# Patient Record
Sex: MALE | Race: WHITE | URBAN - METROPOLITAN AREA
[De-identification: names, ages, dates, MRNs, and addresses within clinical notes are randomized per-mention and may not be internally consistent; named-entity substitution may affect disease eponyms.]

---

## 2017-05-12 ENCOUNTER — HOSPITAL ENCOUNTER (OUTPATIENT)
Dept: LAB | Age: 27
Discharge: HOME | End: 2017-05-12
Payer: COMMERCIAL

## 2017-05-12 ENCOUNTER — OFFICE VISIT (OUTPATIENT)
Dept: FAMILY MEDICINE CLINIC | Age: 27
End: 2017-05-12

## 2017-05-12 VITALS
BODY MASS INDEX: 29.26 KG/M2 | HEART RATE: 64 BPM | HEIGHT: 71 IN | OXYGEN SATURATION: 99 % | WEIGHT: 209 LBS | SYSTOLIC BLOOD PRESSURE: 141 MMHG | RESPIRATION RATE: 16 BRPM | TEMPERATURE: 97.8 F | DIASTOLIC BLOOD PRESSURE: 83 MMHG

## 2017-05-12 DIAGNOSIS — Z00.00 ROUTINE GENERAL MEDICAL EXAMINATION AT A HEALTH CARE FACILITY: ICD-10-CM

## 2017-05-12 DIAGNOSIS — Z23 ENCOUNTER FOR IMMUNIZATION: ICD-10-CM

## 2017-05-12 DIAGNOSIS — Z11.1 SCREENING EXAMINATION FOR PULMONARY TUBERCULOSIS: ICD-10-CM

## 2017-05-12 DIAGNOSIS — Z00.00 ROUTINE GENERAL MEDICAL EXAMINATION AT A HEALTH CARE FACILITY: Primary | ICD-10-CM

## 2017-05-12 LAB
25(OH)D3 SERPL-MCNC: 11.2 NG/ML (ref 30–100)
ALBUMIN SERPL BCP-MCNC: 4.3 G/DL (ref 3.4–5)
ALBUMIN/GLOB SERPL: 1.3 {RATIO} (ref 0.8–1.7)
ALP SERPL-CCNC: 44 U/L (ref 45–117)
ALT SERPL-CCNC: 35 U/L (ref 16–61)
ANION GAP BLD CALC-SCNC: 7 MMOL/L (ref 3–18)
AST SERPL W P-5'-P-CCNC: 25 U/L (ref 15–37)
BILIRUB SERPL-MCNC: 0.8 MG/DL (ref 0.2–1)
BILIRUB UR QL STRIP: NEGATIVE
BUN SERPL-MCNC: 22 MG/DL (ref 7–18)
BUN/CREAT SERPL: 24 (ref 12–20)
CALCIUM SERPL-MCNC: 8.7 MG/DL (ref 8.5–10.1)
CHLORIDE SERPL-SCNC: 106 MMOL/L (ref 100–108)
CHOLEST SERPL-MCNC: 179 MG/DL
CO2 SERPL-SCNC: 30 MMOL/L (ref 21–32)
CREAT SERPL-MCNC: 0.91 MG/DL (ref 0.6–1.3)
GLOBULIN SER CALC-MCNC: 3.2 G/DL (ref 2–4)
GLUCOSE SERPL-MCNC: 93 MG/DL (ref 74–99)
GLUCOSE UR-MCNC: NEGATIVE MG/DL
HBA1C MFR BLD: 5.2 % (ref 4.2–5.6)
HDLC SERPL-MCNC: 48 MG/DL (ref 40–60)
HDLC SERPL: 3.7 {RATIO} (ref 0–5)
KETONES P FAST UR STRIP-MCNC: NEGATIVE MG/DL
LDLC SERPL CALC-MCNC: 101.2 MG/DL (ref 0–100)
LIPID PROFILE,FLP: ABNORMAL
PH UR STRIP: 6 [PH] (ref 4.6–8)
POTASSIUM SERPL-SCNC: 4.4 MMOL/L (ref 3.5–5.5)
PROT SERPL-MCNC: 7.5 G/DL (ref 6.4–8.2)
PROT UR QL STRIP: NEGATIVE MG/DL
SODIUM SERPL-SCNC: 143 MMOL/L (ref 136–145)
SP GR UR STRIP: 1.02 (ref 1–1.03)
TRIGL SERPL-MCNC: 149 MG/DL (ref ?–150)
TSH W FREE THYROID I,TSHELE: 1.25 UIU/ML (ref 0.36–3.74)
UA UROBILINOGEN AMB POC: NORMAL (ref 0.2–1)
URINALYSIS CLARITY POC: CLEAR
URINALYSIS COLOR POC: YELLOW
URINE BLOOD POC: NEGATIVE
URINE LEUKOCYTES POC: NEGATIVE
URINE NITRITES POC: NEGATIVE
VLDLC SERPL CALC-MCNC: 29.8 MG/DL

## 2017-05-12 PROCEDURE — 80053 COMPREHEN METABOLIC PANEL: CPT | Performed by: PHYSICIAN ASSISTANT

## 2017-05-12 PROCEDURE — 84443 ASSAY THYROID STIM HORMONE: CPT | Performed by: PHYSICIAN ASSISTANT

## 2017-05-12 PROCEDURE — 80061 LIPID PANEL: CPT | Performed by: PHYSICIAN ASSISTANT

## 2017-05-12 PROCEDURE — 83036 HEMOGLOBIN GLYCOSYLATED A1C: CPT | Performed by: PHYSICIAN ASSISTANT

## 2017-05-12 PROCEDURE — 82306 VITAMIN D 25 HYDROXY: CPT | Performed by: PHYSICIAN ASSISTANT

## 2017-05-12 NOTE — MR AVS SNAPSHOT
Visit Information Date & Time Provider Department Dept. Phone Encounter #  
 5/12/2017 10:00 AM Ronna Moya PA-C [facility] [phone] [id number] Follow-up Instructions Return in about 1 year (around 5/12/2018). Upcoming Health Maintenance Date Due DTaP/Tdap/Td series (1 - Tdap) 12/10/2010 INFLUENZA AGE 9 TO ADULT 8/1/2017 Allergies as of 5/12/2017  Review Complete On: 5/12/2017 By: Margaret Cerda No Known Allergies Current Immunizations  Never Reviewed Name Date  
 TB Skin Test (PPD) Intradermal 5/12/2017 Not reviewed this visit You Were Diagnosed With   
  
 Codes Comments Routine general medical examination at a health care facility    -  Primary ICD-10-CM: Z00.00 ICD-9-CM: V70.0 Encounter for immunization     ICD-10-CM: F50 ICD-9-CM: V03.89 Screening examination for pulmonary tuberculosis     ICD-10-CM: Z11.1 ICD-9-CM: V74.1 Vitals BP Pulse Temp Resp Height(growth percentile) Weight(growth percentile) 141/83 (BP 1 Location: Right arm, BP Patient Position: Sitting) 64 97.8 °F (36.6 °C) (Oral) 16 5' 11\" (1.803 m) 209 lb (94.8 kg) SpO2 BMI Smoking Status 99% 29.15 kg/m2 Never Smoker Vitals History BMI and BSA Data Body Mass Index Body Surface Area  
 29.15 kg/m 2 2.18 m 2 Preferred Pharmacy Pharmacy Name Phone [facility], [address] [place] [address] [phone] Your Updated Medication List  
  
   
This list is accurate as of: 5/12/17 11:00 AM.  Always use your most recent med list.  
  
  
  
  
 clindamycin 300 mg capsule Commonly known as:  CLEOCIN Take 300 mg by mouth three (3) times daily. mupirocin 2 % ointment Commonly known as:  [facility] Apply twice daily to each nasal septum for 7 days We Performed the Following AMB POC HEMOGLOBIN A1C [88893 CPT(R)] AMB POC TUBERCULOSIS, INTRADERMAL (SKIN TEST) [71927 CPT(R)] AMB POC URINALYSIS DIP STICK AUTO W/O MICRO [91295 CPT(R)] Follow-up Instructions Return in about 1 year (around 5/12/2018). To-Do List   
 05/12/2017 Lab:  LIPID PANEL   
  
 05/12/2017 Lab:  METABOLIC PANEL, COMPREHENSIVE   
  
 05/12/2017 Lab:  THYROID CASCADE PROFILE   
  
 05/12/2017 Lab:  VITAMIN D, 25 HYDROXY Patient Instructions DASH Diet: Care Instructions Your Care Instructions The DASH diet is an eating plan that can help lower your blood pressure. DASH stands for Dietary Approaches to Stop Hypertension. Hypertension is high blood pressure. The DASH diet focuses on eating foods that are high in calcium, potassium, and magnesium. These nutrients can lower blood pressure. The foods that are highest in these nutrients are fruits, vegetables, dairy products, nuts, seeds, and legumes. But taking calcium, potassium, and magnesium supplements instead of eating foods that are high in those nutrients does not have the same effect. The DASH diet also includes whole grains, fish, and poultry. The DASH diet is one of several lifestyle changes your doctor may recommend to lower your high blood pressure. Your doctor may also want you to decrease the amount of sodium in your diet. Lowering sodium while following the DASH diet can lower blood pressure even further than just the DASH diet alone. Follow-up care is a key part of your treatment and safety. Be sure to make and go to all appointments, and call your doctor if you are having problems. It's also a good idea to know your test results and keep a list of the medicines you take. How can you care for yourself at home? Following the DASH diet Eat 4 to 5 servings of fruit each day.  A serving is 1 medium-sized piece of fruit, ½ cup chopped or canned fruit, 1/4 cup dried fruit, or 4 ounces (½ cup) of fruit juice. Choose whole fruit much more often than fruit juice. Eat 4 to 5 servings--or more--of vegetables each day. A serving is 1 cup of lettuce or raw leafy vegetables, ½ cup of chopped or cooked vegetables, or 4 ounces (½ cup) of vegetable juice. Choose vegetables more often than vegetable juice. Get 2 to 3 servings of dairy each day, if you tolerate dairy. A serving is 8 ounces of milk, 1 cup of yogurt, or 1 ½ ounces of cheese. Choose whole grains over refined grains if you eat grain-based foods (bread, rice, cereal, pasta). Beware of products labeled \"made with whole grains,\" as this is often a marker of over-processing. Choose products with the highest amount of fiber available. Eat 4 to 5 servings of nuts, seeds, and legumes (cooked dried beans, lentils, and split peas) each week. A serving is 1/3 cup of nuts, 2 tablespoons of seeds, or ½ cup of cooked beans or peas. Avoid trans fats, hydrogenated oils, and industrial oils such as \"vegetable,\" soy, and canola. Extra virgin olive and coconut oils are great alternatives, as well as nut and seed oils such as flax, walnut, and avocado. Limit sweets and added sugars to 5 servings or less a week. A serving is 1 tablespoon jelly or jam, ½ cup sorbet, or 1 cup of lemonade. Maintain a sensible intake of sodium, keeping in mind that highly processed foods and restaurant foods tend to be high in sodium and low in potassium, magnesium, and calcium. It is important to maintain a balance of these nutrients. http://authoritynutrition.com/how-much-sodium-per-day/ 
 
 
 
Tips for success Start small. Do not try to make dramatic changes to your diet all at once. You might feel that you are missing out on your favorite foods and then be more likely to not follow the plan. Make small changes, and stick with them. Once those changes become habit, add a few more changes. Try some of the following: Make it a goal to eat a fruit or vegetable at every meal and at snacks. This will make it easy to get the recommended amount of fruits and vegetables each day. Try yogurt topped with fruit and nuts for a snack or healthy dessert. Add lettuce, tomato, cucumber, and onion to sandwiches. Combine a ready-made pizza crust with low-fat mozzarella cheese and lots of vegetable toppings. Try using tomatoes, squash, spinach, broccoli, carrots, cauliflower, and onions. Have a variety of cut-up vegetables with a low-fat dip as an appetizer instead of chips and dip. Sprinkle sunflower seeds or chopped almonds over salads. Or try adding chopped walnuts or almonds to cooked vegetables. Try some vegetarian meals using beans and peas. Add garbanzo or kidney beans to salads. Make burritos and tacos with mashed kim beans or black beans. http://"[employer].eGym/how-much-sodium-per-day/ 
 
QuietOptions.co.uk Reducing dietary intake of carbohydrates can be an effective way of losing weight and improving metabolic diseases such as diabetes, pre-diabetes, hypertension, and high cholesterol. Although some end up eating fewer calories on this type of diet, it is not a calorie-restricted diet per se. Most people will need to replace the calories from carbohydrates with healthy fats and protein in order to remain satisfied and maintain or increase their metabolism. Here are some great articles on low-carb diets:  
https://"[employer].eGym/low-carb-diet-meal-plan-and-menu/ 
https://"[employer].eGym/yqu-hoqz-muwkg-per-day-to-lose-weight/ 
https://"[employer]. eGym/diabetes-carbs-per-day/ They also have a good intermittent fasting article. Well Visit, Ages 25 to 48: Care Instructions Your Care Instructions Physical exams can help you stay healthy.  Your doctor has checked your overall health and may have suggested ways to take good care of yourself. He or she also may have recommended tests. At home, you can help prevent illness with healthy eating, regular exercise, and other steps. Follow-up care is a key part of your treatment and safety. Be sure to make and go to all appointments, and call your doctor if you are having problems. It's also a good idea to know your test results and keep a list of the medicines you take. How can you care for yourself at home? · Reach and stay at a healthy weight. This will lower your risk for many problems, such as obesity, diabetes, heart disease, and high blood pressure. · Get at least 30 minutes of physical activity on most days of the week. Walking is a good choice. You also may want to do other activities, such as running, swimming, cycling, or playing tennis or team sports. Discuss any changes in your exercise program with your doctor. · Do not smoke or allow others to smoke around you. If you need help quitting, talk to your doctor about stop-smoking programs and medicines. These can increase your chances of quitting for good. · Talk to your doctor about whether you have any risk factors for sexually transmitted infections (STIs). Having one sex partner (who does not have STIs and does not have sex with anyone else) is a good way to avoid these infections. · Use birth control if you do not want to have children at this time. Talk with your doctor about the choices available and what might be best for you. · Protect your skin from too much sun. When you're outdoors from 10 a.m. to 4 p.m., stay in the shade or cover up with clothing and a hat with a wide brim. Wear sunglasses that block UV rays. Even when it's cloudy, put broad-spectrum sunscreen (SPF 30 or higher) on any exposed skin. · See a dentist one or two times a year for checkups and to have your teeth cleaned. · Wear a seat belt in the car. · Drink alcohol in moderation, if at all. That means no more than 2 drinks a day for men and 1 drink a day for women. Follow your doctor's advice about when to have certain tests. These tests can spot problems early. For everyone · Cholesterol. Have the fat (cholesterol) in your blood tested after age 21. Your doctor will tell you how often to have this done based on your age, family history, or other things that can increase your risk for heart disease. · Blood pressure. Have your blood pressure checked during a routine doctor visit. Your doctor will tell you how often to check your blood pressure based on your age, your blood pressure results, and other factors. · Vision. Talk with your doctor about how often to have a glaucoma test. 
· Diabetes. Ask your doctor whether you should have tests for diabetes. · Colon cancer. Have a test for colon cancer at age 48. You may have one of several tests. If you are younger than 48, you may need a test earlier if you have any risk factors. Risk factors include whether you already had a precancerous polyp removed from your colon or whether your parent, brother, sister, or child has had colon cancer. For women · Breast exam and mammogram. Talk to your doctor about when you should have a clinical breast exam and a mammogram. Medical experts differ on whether and how often women under 50 should have these tests. Your doctor can help you decide what is right for you. · Pap test and pelvic exam. Begin Pap tests at age 24. A Pap test is the best way to find cervical cancer. The test often is part of a pelvic exam. Ask how often to have this test. 
· Tests for sexually transmitted infections (STIs). Ask whether you should have tests for STIs. You may be at risk if you have sex with more than one person, especially if your partners do not wear condoms. For men · Tests for sexually transmitted infections (STIs).  Ask whether you should have tests for STIs. You may be at risk if you have sex with more than one person, especially if you do not wear a condom. · Testicular cancer exam. Ask your doctor whether you should check your testicles regularly. · Prostate exam. Talk to your doctor about whether you should have a blood test (called a PSA test) for prostate cancer. Experts differ on whether and when men should have this test. Some experts suggest it if you are older than 39 and are -American or have a father or brother who got prostate cancer when he was younger than 72. When should you call for help? Watch closely for changes in your health, and be sure to contact your doctor if you have any problems or symptoms that concern you. Where can you learn more? Go to http://gabriel-cheri.info/. Enter P072 in the search box to learn more about \"Well Visit, Ages 25 to 48: Care Instructions. \" Current as of: July 19, 2016 Content Version: 11.2 © 3322-5453 [employer]. Care instructions adapted under license by [employer] (which disclaims liability or warranty for this information). If you have questions about a medical condition or this instruction, always ask your healthcare professional. [address] any warranty or liability for your use of this information. Introducing [facility]! Dear Noe Eagle: 
Thank you for requesting a [employer] account. Our records indicate that you already have an active [employer] account. You can access your account anytime at [email or web address] Did you know that you can access your hospital and ER discharge instructions at any time in [employer]? You can also review all of your test results from your hospital stay or ER visit. Additional Information If you have questions, please visit the Frequently Asked Questions section of the [employer] website at [email or web address] Remember, [employer] is NOT to be used for urgent needs. For medical emergencies, dial 911. Now available from your iPhone and Android! Please provide this summary of care documentation to your next provider. If you have any questions after today's visit, please call [phone].

## 2017-05-12 NOTE — PROGRESS NOTES
HISTORY OF PRESENT ILLNESS  Rhoda Acosta is a 31 y.o. male. HPI Comments: Pt presents for a routine physical. Denies any acute complaints. He works out 30 minutes per day, mostly weight lifting; generally avoids junk foods and fatty foods. States his mom has hypertension, but his pressure generally hangs out in the pre-hypertensive range (130s/70s). 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 Annual Exam   Pertinent negatives include no chest pain, no headaches and no shortness of breath. Review of Systems   Constitutional: Negative for chills, fever and malaise/fatigue. HENT: Negative for ear pain. Eyes: Negative for blurred vision, double vision and pain. Respiratory: Negative for cough and shortness of breath. Cardiovascular: Negative for chest pain and palpitations. Gastrointestinal: Negative for diarrhea, nausea and vomiting. Genitourinary: Negative for dysuria. Musculoskeletal: Negative for joint pain and myalgias. Neurological: Negative for dizziness, tingling, weakness and headaches. Psychiatric/Behavioral: Negative for depression. The patient is not nervous/anxious and does not have insomnia. Visit Vitals    /83 (BP 1 Location: Right arm, BP Patient Position: Sitting)  Comment: maroon cuff (large biceps)    Pulse 64    Temp 97.8 °F (36.6 °C) (Oral)    Resp 16    Ht 5' 11\" (1.803 m)    Wt 209 lb (94.8 kg)    SpO2 99%    BMI 29.15 kg/m2       Physical Exam   Constitutional: He is oriented to person, place, and time. Vital signs are normal. He appears well-developed and well-nourished. He is cooperative. He does not appear ill. No distress. HENT:   Head: Normocephalic and atraumatic.    Right Ear: Tympanic membrane, external ear and ear canal normal.   Left Ear: Tympanic membrane, external ear and ear canal normal.   Nose: Nose normal.   Mouth/Throat: Uvula is midline, oropharynx is clear and moist and mucous membranes are normal.   Eyes: Conjunctivae are normal.   Neck: Neck supple. No thyromegaly present. Cardiovascular: Normal rate, regular rhythm and normal heart sounds. Exam reveals no gallop and no friction rub. No murmur heard. Pulses:       Radial pulses are 2+ on the right side, and 2+ on the left side. Posterior tibial pulses are 2+ on the right side, and 2+ on the left side. Pulmonary/Chest: Effort normal and breath sounds normal. No respiratory distress. He has no decreased breath sounds. He has no wheezes. He has no rhonchi. He has no rales. Abdominal: Soft. Normal appearance and bowel sounds are normal. There is no hepatosplenomegaly. There is no tenderness. No hernia. Hernia confirmed negative in the ventral area, confirmed negative in the right inguinal area and confirmed negative in the left inguinal area. Few abdominal striae noted   Genitourinary: Testes normal and penis normal. Right testis shows no mass, no swelling and no tenderness. Right testis is descended. Left testis shows no mass, no swelling and no tenderness. Left testis is descended. Circumcised. Musculoskeletal: He exhibits no edema, tenderness or deformity. Lymphadenopathy:     He has no cervical adenopathy. Neurological: He is alert and oriented to person, place, and time. He displays no atrophy. No cranial nerve deficit. He exhibits normal muscle tone. Gait normal.   Reflex Scores:       Patellar reflexes are 2+ on the right side and 2+ on the left side. CN II-XII grossly intact   Skin: Skin is warm and dry. No rash noted. He is not diaphoretic. Psychiatric: He has a normal mood and affect. His speech is normal and behavior is normal. Thought content normal.   Nursing note and vitals reviewed. ASSESSMENT and PLAN    ICD-10-CM ICD-9-CM    1.  Routine general medical examination at a health care facility Z00.00 V70.0 AMB POC URINALYSIS DIP STICK AUTO W/O MICRO METABOLIC PANEL, COMPREHENSIVE      VITAMIN D, 25 HYDROXY      THYROID CASCADE PROFILE      LIPID PANEL      HEMOGLOBIN A1C W/O EAG         2. Encounter for immunization Z23 V03.89    3. Screening examination for pulmonary tuberculosis Z11.1 V74.1 AMB POC TUBERCULOSIS, INTRADERMAL (SKIN TEST)     Healthy young male with some excess body fat, but pt is also very muscular, therefore BMI is not the best indicator. Pt is encouraged to continue to watch his blood pressure, increase cardio to included a couple of days of interval training per week, and continue with strength training. Consider some combination of intermittent fasting and or low-carb to increase growth hormone and reduce body fat. Also reviewed the DASH diet. Will follow labs and advise, otherwise follow-up in 1 year. Pt verbalized understanding and agreement with the plan of care.     Hugo Posada PA-C

## 2017-05-12 NOTE — PATIENT INSTRUCTIONS
DASH Diet: Care Instructions     Your Care Instructions   The DASH diet is an eating plan that can help lower your blood pressure. DASH stands for Dietary Approaches to Stop Hypertension. Hypertension is high blood pressure. The DASH diet focuses on eating foods that are high in calcium, potassium, and magnesium. These nutrients can lower blood pressure. The foods that are highest in these nutrients are fruits, vegetables, dairy products, nuts, seeds, and legumes. But taking calcium, potassium, and magnesium supplements instead of eating foods that are high in those nutrients does not have the same effect. The DASH diet also includes whole grains, fish, and poultry. The DASH diet is one of several lifestyle changes your doctor may recommend to lower your high blood pressure. Your doctor may also want you to decrease the amount of sodium in your diet. Lowering sodium while following the DASH diet can lower blood pressure even further than just the DASH diet alone. Follow-up care is a key part of your treatment and safety. Be sure to make and go to all appointments, and call your doctor if you are having problems. It's also a good idea to know your test results and keep a list of the medicines you take. How can you care for yourself at home? Following the DASH diet   Eat 4 to 5 servings of fruit each day. A serving is 1 medium-sized piece of fruit, ½ cup chopped or canned fruit, 1/4 cup dried fruit, or 4 ounces (½ cup) of fruit juice. Choose whole fruit much more often than fruit juice. Eat 4 to 5 servings--or more--of vegetables each day. A serving is 1 cup of lettuce or raw leafy vegetables, ½ cup of chopped or cooked vegetables, or 4 ounces (½ cup) of vegetable juice. Choose vegetables more often than vegetable juice. Get 2 to 3 servings of dairy each day, if you tolerate dairy. A serving is 8 ounces of milk, 1 cup of yogurt, or 1 ½ ounces of cheese.    Choose whole grains over refined grains if you eat grain-based foods (bread, rice, cereal, pasta). Beware of products labeled \"made with whole grains,\" as this is often a marker of over-processing. Choose products with the highest amount of fiber available. Eat 4 to 5 servings of nuts, seeds, and legumes (cooked dried beans, lentils, and split peas) each week. A serving is 1/3 cup of nuts, 2 tablespoons of seeds, or ½ cup of cooked beans or peas. Avoid trans fats, hydrogenated oils, and industrial oils such as \"vegetable,\" soy, and canola. Extra virgin olive and coconut oils are great alternatives, as well as nut and seed oils such as flax, walnut, and avocado. Limit sweets and added sugars to 5 servings or less a week. A serving is 1 tablespoon jelly or jam, ½ cup sorbet, or 1 cup of lemonade. Maintain a sensible intake of sodium, keeping in mind that highly processed foods and restaurant foods tend to be high in sodium and low in potassium, magnesium, and calcium. It is important to maintain a balance of these nutrients. http://authoritynutrition.com/how-much-sodium-per-day/        Tips for success   Start small. Do not try to make dramatic changes to your diet all at once. You might feel that you are missing out on your favorite foods and then be more likely to not follow the plan. Make small changes, and stick with them. Once those changes become habit, add a few more changes. Try some of the following:   Make it a goal to eat a fruit or vegetable at every meal and at snacks. This will make it easy to get the recommended amount of fruits and vegetables each day. Try yogurt topped with fruit and nuts for a snack or healthy dessert. Add lettuce, tomato, cucumber, and onion to sandwiches. Combine a ready-made pizza crust with low-fat mozzarella cheese and lots of vegetable toppings. Try using tomatoes, squash, spinach, broccoli, carrots, cauliflower, and onions.    Have a variety of cut-up vegetables with a low-fat dip as an appetizer instead of chips and dip. Sprinkle sunflower seeds or chopped almonds over salads. Or try adding chopped walnuts or almonds to cooked vegetables. Try some vegetarian meals using beans and peas. Add garbanzo or kidney beans to salads. Make burritos and tacos with mashed kim beans or black beans. http://[employer]Good/how-much-sodium-per-day/    QuietOptions.co.uk    Reducing dietary intake of carbohydrates can be an effective way of losing weight and improving metabolic diseases such as diabetes, pre-diabetes, hypertension, and high cholesterol. Although some end up eating fewer calories on this type of diet, it is not a calorie-restricted diet per se. Most people will need to replace the calories from carbohydrates with healthy fats and protein in order to remain satisfied and maintain or increase their metabolism. Here are some great articles on low-carb diets:   https://[employer]/low-carb-diet-meal-plan-and-menu/  https://[employer]/ctz-jjpc-hyebg-per-day-to-lose-weight/  https://[employer]/diabetes-carbs-per-day/    They also have a good intermittent fasting article. Well Visit, Ages 25 to 48: Care Instructions  Your Care Instructions  Physical exams can help you stay healthy. Your doctor has checked your overall health and may have suggested ways to take good care of yourself. He or she also may have recommended tests. At home, you can help prevent illness with healthy eating, regular exercise, and other steps. Follow-up care is a key part of your treatment and safety. Be sure to make and go to all appointments, and call your doctor if you are having problems. It's also a good idea to know your test results and keep a list of the medicines you take. How can you care for yourself at home? · Reach and stay at a healthy weight.  This will lower your risk for many problems, such as obesity, diabetes, heart disease, and high blood pressure. · Get at least 30 minutes of physical activity on most days of the week. Walking is a good choice. You also may want to do other activities, such as running, swimming, cycling, or playing tennis or team sports. Discuss any changes in your exercise program with your doctor. · Do not smoke or allow others to smoke around you. If you need help quitting, talk to your doctor about stop-smoking programs and medicines. These can increase your chances of quitting for good. · Talk to your doctor about whether you have any risk factors for sexually transmitted infections (STIs). Having one sex partner (who does not have STIs and does not have sex with anyone else) is a good way to avoid these infections. · Use birth control if you do not want to have children at this time. Talk with your doctor about the choices available and what might be best for you. · Protect your skin from too much sun. When you're outdoors from 10 a.m. to 4 p.m., stay in the shade or cover up with clothing and a hat with a wide brim. Wear sunglasses that block UV rays. Even when it's cloudy, put broad-spectrum sunscreen (SPF 30 or higher) on any exposed skin. · See a dentist one or two times a year for checkups and to have your teeth cleaned. · Wear a seat belt in the car. · Drink alcohol in moderation, if at all. That means no more than 2 drinks a day for men and 1 drink a day for women. Follow your doctor's advice about when to have certain tests. These tests can spot problems early. For everyone  · Cholesterol. Have the fat (cholesterol) in your blood tested after age 21. Your doctor will tell you how often to have this done based on your age, family history, or other things that can increase your risk for heart disease. · Blood pressure. Have your blood pressure checked during a routine doctor visit.  Your doctor will tell you how often to check your blood pressure based on your age, your blood pressure results, and other factors. · Vision. Talk with your doctor about how often to have a glaucoma test.  · Diabetes. Ask your doctor whether you should have tests for diabetes. · Colon cancer. Have a test for colon cancer at age 48. You may have one of several tests. If you are younger than 48, you may need a test earlier if you have any risk factors. Risk factors include whether you already had a precancerous polyp removed from your colon or whether your parent, brother, sister, or child has had colon cancer. For women  · Breast exam and mammogram. Talk to your doctor about when you should have a clinical breast exam and a mammogram. Medical experts differ on whether and how often women under 50 should have these tests. Your doctor can help you decide what is right for you. · Pap test and pelvic exam. Begin Pap tests at age 24. A Pap test is the best way to find cervical cancer. The test often is part of a pelvic exam. Ask how often to have this test.  · Tests for sexually transmitted infections (STIs). Ask whether you should have tests for STIs. You may be at risk if you have sex with more than one person, especially if your partners do not wear condoms. For men  · Tests for sexually transmitted infections (STIs). Ask whether you should have tests for STIs. You may be at risk if you have sex with more than one person, especially if you do not wear a condom. · Testicular cancer exam. Ask your doctor whether you should check your testicles regularly. · Prostate exam. Talk to your doctor about whether you should have a blood test (called a PSA test) for prostate cancer. Experts differ on whether and when men should have this test. Some experts suggest it if you are older than 39 and are -American or have a father or brother who got prostate cancer when he was younger than 72. When should you call for help?   Watch closely for changes in your health, and be sure to contact your doctor if you have any problems or symptoms that concern you. Where can you learn more? Go to http://gabriel-cheri.info/. Enter P072 in the search box to learn more about \"Well Visit, Ages 25 to 48: Care Instructions. \"  Current as of: July 19, 2016  Content Version: 11.2  © 3177-2122 [employer]. Care instructions adapted under license by [employer] (which disclaims liability or warranty for this information). If you have questions about a medical condition or this instruction, always ask your healthcare professional. [address] any warranty or liability for your use of this information.

## 2017-05-12 NOTE — PROGRESS NOTES
Eugenio Montoya is a 31 y.o. male   Chief Complaint   Patient presents with    Annual Exam     patient is here for annual exam     1. Have you been to the ER, urgent care clinic or hospitalized since your last visit? YES.     2. Have you seen or consulted any other health care providers outside of the [facility] since your last visit (Include any pap smears or colon screening)? YES  [facility] [place] [facility]    Do you have an Advanced Directive? NO    Would you like information on Advanced Directives?  NO    Learning Assessment 5/12/2017   PRIMARY LEARNER Patient   HIGHEST LEVEL OF EDUCATION - PRIMARY LEARNER  > 4 YEARS OF COLLEGE   BARRIERS PRIMARY LEARNER NONE   CO-LEARNER CAREGIVER No   PRIMARY LANGUAGE ENGLISH   LEARNER PREFERENCE PRIMARY VIDEOS   ANSWERED BY patient   RELATIONSHIP SELF

## 2017-05-12 NOTE — PROGRESS NOTES
Patient presented to office for PPD Placement. Medications and allergies reviewed with patient. PPD given in right forearm. Patient tolerated procedure well. Patient will return to office in 2 -3 days for PPD reading. Patient verbalizes understanding.

## 2017-05-15 ENCOUNTER — LAB ONLY (OUTPATIENT)
Dept: FAMILY MEDICINE CLINIC | Age: 27
End: 2017-05-15

## 2017-05-15 LAB
MM INDURATION POC: 0 MM (ref 0–5)
PPD POC: NEGATIVE NEGATIVE

## 2017-05-15 NOTE — PROGRESS NOTES
Patient came in to get TB test read. Test was Negative at 0 mm. Patient informed to return 7-14 days after initial placement of first PPD for second step reading.

## 2017-05-16 DIAGNOSIS — E55.9 HYPOVITAMINOSIS D: Primary | ICD-10-CM

## 2017-05-16 RX ORDER — ASPIRIN 325 MG
1 TABLET, DELAYED RELEASE (ENTERIC COATED) ORAL
Qty: 16 CAP | Refills: 0 | Status: SHIPPED | OUTPATIENT
Start: 2017-05-16

## 2017-05-16 NOTE — PROGRESS NOTES
Vitamin D: 16 weeks replenishment + sensible sun exposure + increased dietary intake; recheck in 3 months. Lipids: increase fiber intake (veg, fruit, nuts, seeds), consider activity level, lose a few pounds. Labs look good overall.

## 2017-05-22 ENCOUNTER — CLINICAL SUPPORT (OUTPATIENT)
Dept: FAMILY MEDICINE CLINIC | Age: 27
End: 2017-05-22

## 2017-05-22 DIAGNOSIS — Z11.1 TUBERCULOSIS SCREENING: Primary | ICD-10-CM

## 2017-05-24 ENCOUNTER — LAB ONLY (OUTPATIENT)
Dept: FAMILY MEDICINE CLINIC | Age: 27
End: 2017-05-24

## 2017-05-24 LAB
MM INDURATION POC: 0 MM (ref 0–5)
PPD POC: NEGATIVE NEGATIVE

## 2017-05-24 NOTE — PROGRESS NOTES
Pt here to have their PPD skin test read. Induration was 0mm. No sign of redness or swelling. Pt denies itching or discomfort.

## 2019-09-25 PROBLEM — Z00.00 ROUTINE GENERAL MEDICAL EXAMINATION AT A HEALTH CARE FACILITY: Status: RESOLVED | Noted: 2017-05-12 | Resolved: 2019-09-25

## 2019-09-25 PROBLEM — Z11.1 TUBERCULOSIS SCREENING: Status: RESOLVED | Noted: 2017-05-22 | Resolved: 2019-09-25
